# Patient Record
Sex: FEMALE
[De-identification: names, ages, dates, MRNs, and addresses within clinical notes are randomized per-mention and may not be internally consistent; named-entity substitution may affect disease eponyms.]

---

## 2019-07-19 ENCOUNTER — APPOINTMENT (OUTPATIENT)
Dept: BARIATRICS | Facility: CLINIC | Age: 54
End: 2019-07-19
Payer: COMMERCIAL

## 2019-07-19 VITALS
HEIGHT: 67 IN | BODY MASS INDEX: 16.86 KG/M2 | HEART RATE: 63 BPM | WEIGHT: 107.4 LBS | DIASTOLIC BLOOD PRESSURE: 67 MMHG | SYSTOLIC BLOOD PRESSURE: 100 MMHG

## 2019-07-19 DIAGNOSIS — R63.4 ABNORMAL WEIGHT LOSS: ICD-10-CM

## 2019-07-19 DIAGNOSIS — E66.01 MORBID (SEVERE) OBESITY DUE TO EXCESS CALORIES: ICD-10-CM

## 2019-07-19 PROCEDURE — 99213 OFFICE O/P EST LOW 20 MIN: CPT

## 2019-07-19 NOTE — HISTORY OF PRESENT ILLNESS
[de-identified] : Birgit is 4 years post sleeve gastrectomy which se did extremely well and weight in Feb 2018 was 134 lbs and had no emesis until being diagnosed with bladder cancer and having had chemotherapy. Her weight is down to 107 lbs ad she is having difficulty with PO. She reports having had CT scans that showed no evidence of any obstruction but think it is best that we get an EGD. Will refer to Dr. Renee.

## 2019-07-19 NOTE — ASSESSMENT
[FreeTextEntry1] : Excessive weight loss probably secondary to her chemotherapy but will get EGD. Blood work followed by [facility] has been fine.

## 2019-07-19 NOTE — REASON FOR VISIT
[Follow-Up Visit] : a follow-up visit for [S/P Bariatric Surgery] : s/p bariatric surgery [Nausea/Vomiting] : nausea/vomiting [GERD] : gerd